# Patient Record
Sex: MALE | Race: BLACK OR AFRICAN AMERICAN
[De-identification: names, ages, dates, MRNs, and addresses within clinical notes are randomized per-mention and may not be internally consistent; named-entity substitution may affect disease eponyms.]

---

## 2017-11-18 ENCOUNTER — HOSPITAL ENCOUNTER (EMERGENCY)
Dept: HOSPITAL 62 - ER | Age: 47
Discharge: HOME | End: 2017-11-18
Payer: MEDICARE

## 2017-11-18 VITALS — DIASTOLIC BLOOD PRESSURE: 82 MMHG | SYSTOLIC BLOOD PRESSURE: 127 MMHG

## 2017-11-18 DIAGNOSIS — N30.00: Primary | ICD-10-CM

## 2017-11-18 LAB
APPEARANCE UR: CLEAR
BILIRUB UR QL STRIP: NEGATIVE
GLUCOSE UR STRIP-MCNC: NEGATIVE MG/DL
KETONES UR STRIP-MCNC: NEGATIVE MG/DL
NITRITE UR QL STRIP: NEGATIVE
PH UR STRIP: 5 [PH] (ref 5–9)
PROT UR STRIP-MCNC: NEGATIVE MG/DL
SP GR UR STRIP: 1.02
UROBILINOGEN UR-MCNC: NEGATIVE MG/DL (ref ?–2)

## 2017-11-18 PROCEDURE — 81001 URINALYSIS AUTO W/SCOPE: CPT

## 2017-11-18 PROCEDURE — 99283 EMERGENCY DEPT VISIT LOW MDM: CPT

## 2017-11-18 NOTE — ER DOCUMENT REPORT
ED GI/





- General


Chief Complaint: Urinary Problem


Stated Complaint: FREQUENT URINATION


Time Seen by Provider: 11/18/17 16:21


Mode of Arrival: Ambulatory


Information source: Patient, Relative


TRAVEL OUTSIDE OF THE [place] IN LAST 30 DAYS: No





- HPI


Patient complains to provider of: Hematuria - mother states pt. has been having 

frequency and hematuria for the past 2-3 days.





- Related Data


Allergies/Adverse Reactions: 


 





No Known Allergies Allergy (Verified 11/18/17 16:11)


 











Past Medical History





- General


Information source: Relative





- Social History


Smoking Status: Never Smoker


Cigarette use (# per day): No


Chew tobacco use (# tins/day): No


Smoking Education Provided: No


Family History: Reviewed & Not Pertinent


Malignancy Medical History: Reports Hx Brain Cancer





- Immunizations


Hx Diphtheria, Pertussis, Tetanus Vaccination: Yes





Review of Systems





- Review of Systems


Constitutional: No symptoms reported


Cardiovascular: No symptoms reported


Respiratory: No symptoms reported


Gastrointestinal: No symptoms reported


Genitourinary: See HPI, Frequency, Hematuria


Musculoskeletal: No symptoms reported


-: Yes All other systems reviewed and negative





Physical Exam





- Vital signs


Vitals: 





 











Temp Pulse Resp BP Pulse Ox


 


 98.8 F   100   16   127/82 H  95 


 


 11/18/17 16:13  11/18/17 16:13  11/18/17 16:13  11/18/17 16:13  11/18/17 16:13














- General


General appearance: Appears well


In distress: None





- Respiratory


Respiratory status: No respiratory distress


Breath sounds: Normal





- Cardiovascular


Rhythm: Regular


Heart sounds: Normal auscultation





- Abdominal


Inspection: Normal


Tenderness: Nontender





Course





- Vital Signs


Vital signs: 





 











Temp Pulse Resp BP Pulse Ox


 


 98.8 F   100   16   127/82 H  95 


 


 11/18/17 16:13  11/18/17 16:13  11/18/17 16:13  11/18/17 16:13  11/18/17 16:13

## 2017-11-20 ENCOUNTER — HOSPITAL ENCOUNTER (EMERGENCY)
Dept: HOSPITAL 62 - ER | Age: 47
Discharge: HOME | End: 2017-11-20
Payer: MEDICARE

## 2017-11-20 VITALS — DIASTOLIC BLOOD PRESSURE: 58 MMHG | SYSTOLIC BLOOD PRESSURE: 113 MMHG

## 2017-11-20 DIAGNOSIS — Z85.841: ICD-10-CM

## 2017-11-20 DIAGNOSIS — I10: ICD-10-CM

## 2017-11-20 DIAGNOSIS — N39.0: ICD-10-CM

## 2017-11-20 DIAGNOSIS — T49.0X5A: ICD-10-CM

## 2017-11-20 DIAGNOSIS — R53.1: ICD-10-CM

## 2017-11-20 DIAGNOSIS — L27.0: Primary | ICD-10-CM

## 2017-11-20 DIAGNOSIS — H65.92: ICD-10-CM

## 2017-11-20 DIAGNOSIS — R00.0: ICD-10-CM

## 2017-11-20 LAB
ALBUMIN SERPL-MCNC: 3.5 G/DL (ref 3.5–5)
ALP SERPL-CCNC: 59 U/L (ref 38–126)
ALT SERPL-CCNC: 38 U/L (ref 21–72)
ANION GAP SERPL CALC-SCNC: 15 MMOL/L (ref 5–19)
AST SERPL-CCNC: 42 U/L (ref 17–59)
BASOPHILS # BLD AUTO: 0 10^3/UL (ref 0–0.2)
BASOPHILS NFR BLD AUTO: 0.4 % (ref 0–2)
BILIRUB DIRECT SERPL-MCNC: 0.3 MG/DL (ref 0–0.4)
BILIRUB SERPL-MCNC: 0.5 MG/DL (ref 0.2–1.3)
BUN SERPL-MCNC: 15 MG/DL (ref 7–20)
CALCIUM: 8.6 MG/DL (ref 8.4–10.2)
CHLORIDE SERPL-SCNC: 102 MMOL/L (ref 98–107)
CO2 SERPL-SCNC: 24 MMOL/L (ref 22–30)
CREAT SERPL-MCNC: 1.35 MG/DL (ref 0.52–1.25)
EOSINOPHIL # BLD AUTO: 0.2 10^3/UL (ref 0–0.6)
EOSINOPHIL NFR BLD AUTO: 1.2 % (ref 0–6)
ERYTHROCYTE [DISTWIDTH] IN BLOOD BY AUTOMATED COUNT: 14.4 % (ref 11.5–14)
GLUCOSE SERPL-MCNC: 98 MG/DL (ref 75–110)
HCT VFR BLD CALC: 44.5 % (ref 37.9–51)
HGB BLD-MCNC: 14.8 G/DL (ref 13.5–17)
HGB HCT DIFFERENCE: -0.1
LYMPHOCYTES # BLD AUTO: 1.6 10^3/UL (ref 0.5–4.7)
LYMPHOCYTES NFR BLD AUTO: 12.2 % (ref 13–45)
MCH RBC QN AUTO: 29.3 PG (ref 27–33.4)
MCHC RBC AUTO-ENTMCNC: 33.3 G/DL (ref 32–36)
MCV RBC AUTO: 88 FL (ref 80–97)
MONOCYTES # BLD AUTO: 0.4 10^3/UL (ref 0.1–1.4)
MONOCYTES NFR BLD AUTO: 3.2 % (ref 3–13)
NEUTROPHILS # BLD AUTO: 11.1 10^3/UL (ref 1.7–8.2)
NEUTS SEG NFR BLD AUTO: 83 % (ref 42–78)
POTASSIUM SERPL-SCNC: 4.3 MMOL/L (ref 3.6–5)
PROT SERPL-MCNC: 5.5 G/DL (ref 6.3–8.2)
RBC # BLD AUTO: 5.06 10^6/UL (ref 4.35–5.55)
SODIUM SERPL-SCNC: 140.7 MMOL/L (ref 137–145)
WBC # BLD AUTO: 13.4 10^3/UL (ref 4–10.5)

## 2017-11-20 PROCEDURE — 80053 COMPREHEN METABOLIC PANEL: CPT

## 2017-11-20 PROCEDURE — 99283 EMERGENCY DEPT VISIT LOW MDM: CPT

## 2017-11-20 PROCEDURE — 85025 COMPLETE CBC W/AUTO DIFF WBC: CPT

## 2017-11-20 PROCEDURE — 36415 COLL VENOUS BLD VENIPUNCTURE: CPT

## 2017-11-20 PROCEDURE — 96374 THER/PROPH/DIAG INJ IV PUSH: CPT

## 2017-11-20 PROCEDURE — 96361 HYDRATE IV INFUSION ADD-ON: CPT

## 2017-11-20 NOTE — ER DOCUMENT REPORT
ED Allergic Reaction





- General


Chief Complaint: Allergic Reaction


Stated Complaint: POSSIBLE ALLERGIC REACTION


Time Seen by Provider: 11/20/17 11:27


Mode of Arrival: Wheelchair


Information source: Patient, Parent


Notes: 





Patient has a history of brain tumor since birth.  He states that he was 

recently seen here for a urinary tract infection and given Septra.  He has 

taken his first dose this morning and then developed a rash.  He does not have 

any known history of allergies to medications.  He denies any trouble speaking 

or swallowing.  No trouble breathing.  He has not noticed any abnormal 

sensations in his mouth or tongue swelling.  No lip soreness swelling or 

lesions.  Patient has not been lightheaded or dizzy.  Symptoms of been 

consistent with an allergic reaction consisting of erythema and swelling of the 

extremities and trunk.  Nothing has made it better or worse.  It is been 

constant.  There is been mild to moderate.  There is no radiation of the 

symptoms.


TRAVEL OUTSIDE OF THE [place] IN LAST 30 DAYS: No





- Related Data


Allergies/Adverse Reactions: 


 





sulfamethoxazole [From Bactrim] Allergy (Verified 11/20/17 11:30)


 


trimethoprim [From Bactrim] Allergy (Verified 11/20/17 11:30)


 











Past Medical History





- General


Information source: Patient, Parent





- Social History


Smoking Status: Never Smoker


Chew tobacco use (# tins/day): No


Frequency of alcohol use: None


Drug Abuse: None


Family History: Reviewed & Not Pertinent


Patient has suicidal ideation: No


Patient has homicidal ideation: No





- Past Medical History


Cardiac Medical History: Reports: Hx Hypertension


Renal/ Medical History: Denies: Hx Peritoneal Dialysis


Malignancy Medical History: Reports Hx Brain Cancer





- Immunizations


Hx Diphtheria, Pertussis, Tetanus Vaccination: Yes





Review of Systems





- Review of Systems


Constitutional: denies: Chills, Fever


Cardiovascular: denies: Chest pain, Palpitations


Respiratory: denies: Cough, Short of breath


-: Yes All other systems reviewed and negative





Physical Exam





- Vital signs


Vitals: 





 











Temp Pulse Resp BP Pulse Ox


 


 98.6 F   109 H  18   116/74   98 


 


 11/20/17 10:51  11/20/17 10:51  11/20/17 10:51  11/20/17 10:51  11/20/17 10:51











Interpretation: Tachycardic





- General


General appearance: Appears well, Alert


In distress: None - No distress.  Abdomen is okay





- HEENT


Head: Normocephalic, Atraumatic


Eyes: Normal


Pupils: PERRL


External canal: Cerumen impaction, Other - Right canal is occluded with cerumen


Tympanic membrane: Bulging, Injected, Purulent effusion, Other - Left membrane 

is erythematous and injected and bulging with some surrounding effusion.





- Respiratory


Respiratory status: No respiratory distress


Chest status: Nontender


Breath sounds: Normal


Chest palpation: Normal





- Cardiovascular


Rhythm: Regular, Other - Repeat exam shows pulse to be 88 at 1:40 PM on my exam.


Heart sounds: Normal auscultation


Murmur: No





- Abdominal


Inspection: Normal


Distension: No distension


Bowel sounds: Normal


Tenderness: Nontender


Organomegaly: No organomegaly





- Back


Back: Normal, Nontender





- Extremities


General upper extremity: Other - Bilateral upper extremities have urticaria and 

erythema.  He also has a nonblanching erythematous rash on the left upper 

extremity.


General lower extremity: Normal inspection, Nontender, Normal color, Normal 

temperature.  No: Marivel's sign





- Neurological


Neuro grossly intact: Yes


Cognition: Normal


Hooven Coma Scale Eye Opening: Spontaneous


Isai Coma Scale Verbal: Oriented


Isai Coma Scale Motor: Obeys Commands


Hooven Coma Scale Total: 15


Additional motor exam normals: Weakness, Other - Right upper extremity and 

lower extremities.  This is chronic.


Sensory: Normal





- Psychological


Associated symptoms: Normal affect, Normal mood





- Skin


Skin Temperature: Warm


Skin Moisture: Dry


Skin Color: Other - Patient has diffuse urticaria and erythema of the upper 

extremities and trunk.  He also has a separate rash which seems to consistently 

nonblanching erythema and macules.  There are no oral lesions or lip lesions.





Course





- Re-evaluation


Re-evalutation: 





11/20/17 13:50


On reexam at 140.  Patient is sleeping comfortably but easily awoken.  He 

denies any symptoms.  The rash appears to be resolving.  There is no evidence 

of Montano-Kalen syndrome.  There is no evidence of more than one system 

involvement.  Vital signs are unremarkable.





- Vital Signs


Vital signs: 





 











Temp Pulse Resp BP Pulse Ox


 


 98.6 F   109 H  18   116/74   98 


 


 11/20/17 10:51  11/20/17 10:51  11/20/17 10:51  11/20/17 10:51  11/20/17 10:51














- Laboratory


Result Diagrams: 


 11/20/17 12:15





 11/20/17 12:15


Laboratory results interpreted by me: 





 











  11/20/17 11/20/17





  12:15 12:15


 


WBC  13.4 H 


 


RDW  14.4 H 


 


Seg Neutrophils %  83.0 H 


 


Lymphocytes %  12.2 L 


 


Absolute Neutrophils  11.1 H 


 


Creatinine   1.35 H


 


Est GFR (Non-Af Amer)   56 L


 


Total Protein   5.5 L














Discharge





- Discharge


Clinical Impression: 


 Left otitis media with effusion





Allergic drug reaction


Qualifiers:


 Encounter type: initial encounter Qualified Code(s): T78.40XA - Allergy, 

unspecified, initial encounter





Condition: Stable


Disposition: HOME, SELF-CARE


Instructions:  Otitis Media ([facility]), Acute Allergic Reaction to Drugs ([facility])


Additional Instructions: 


Please be rechecked by your primary care physician in 2-3 days.





Your creatinine (which is a measure of your kidney function) is mildly 

elevated.  This is most likely due to some mild dehydration.  Please increase 

your fluid intake and have your creatinine rechecked at her next doctor's visit.


Prescriptions: 


Cefdinir 300 mg PO BID 7 Days #14 capsule


Diphenhydramine HCl [Benadryl] 25 mg PO Q6 PRN 4 Days #16 capsule


 PRN Reason: 


Prednisone 50 mg PO DAILY 5 Days #5 tablet

## 2017-11-20 NOTE — ER DOCUMENT REPORT
ED Medical Screen (RME)





- General


Chief Complaint: Allergic Reaction


Stated Complaint: POSSIBLE ALLERGIC REACTION


Time Seen by Provider: 11/20/17 11:27


Notes: 


Patient is brought in by mom.  Patient has a chronic diagnosis of brain tumor.  

He was recently seen here and diagnosed with a urinary tract infection.  He has 

taken several days of p.o. Bactrim.  Today he began to have swelling and 

erythema of the upper extremities and trunk.  He denies any problems breathing 

or swallowing.  No oral lesions have been noted.  Patient also claims to have 

decreased hearing in the left ear and family states they noticed discharge from 

this here today.  On exam the left ear does appear infected.  The right ear is 

occluded with cerumen.


TRAVEL OUTSIDE OF THE [place] IN LAST 30 DAYS: No





- Related Data


Allergies/Adverse Reactions: 


 





No Known Allergies Allergy (Verified 11/20/17 10:49)


 











Past Medical History





- Social History


Chew tobacco use (# tins/day): No


Frequency of alcohol use: None


Drug Abuse: None





- Past Medical History


Cardiac Medical History: Reports: Hx Hypertension


Renal/ Medical History: Denies: Hx Peritoneal Dialysis


Malignancy Medical History: Reports Hx Brain Cancer





- Immunizations


Hx Diphtheria, Pertussis, Tetanus Vaccination: Yes





Physical Exam





- Vital signs


Vitals: 





 











Temp Pulse Resp BP Pulse Ox


 


 98.6 F   109 H  18   116/74   98 


 


 11/20/17 10:51  11/20/17 10:51  11/20/17 10:51  11/20/17 10:51  11/20/17 10:51














Course





- Vital Signs


Vital signs: 





 











Temp Pulse Resp BP Pulse Ox


 


 98.6 F   109 H  18   116/74   98 


 


 11/20/17 10:51  11/20/17 10:51  11/20/17 10:51  11/20/17 10:51  11/20/17 10:51

## 2018-06-26 ENCOUNTER — HOSPITAL ENCOUNTER (OUTPATIENT)
Dept: HOSPITAL 62 - SP | Age: 48
End: 2018-06-26
Attending: PODIATRIST
Payer: MEDICARE

## 2018-06-26 DIAGNOSIS — I82.401: Primary | ICD-10-CM

## 2018-06-26 PROCEDURE — 93971 EXTREMITY STUDY: CPT

## 2018-06-26 NOTE — RADIOLOGY REPORT (SQ)
EXAM DESCRIPTION:  VENOUS UNILATERAL LOWER



COMPLETED DATE/TIME:  6/26/2018 3:13 pm



REASON FOR STUDY:  RLE PAIN, ACUTE EMBOLI I82.401  ACUTE EMBOLISM AND THOMBOS UNSP DEEP VEINS OF R LO
W



COMPARISON:  None.



TECHNIQUE:  Dynamic and static gray scale and color images acquired of the right leg venous system. S
elected spectral images acquired with additional compression and augmentation maneuvers. The contrala
teral common femoral vein and saphenofemoral junction were also imaged. Images stored on PACS.



LIMITATIONS:  None.



FINDINGS:  COMMON FEMORAL: Normal phasicity, compression and augmentation. No visualized echogenic ma
terial on gray scale. No defects on color images.

FEMORAL: Normal compression and augmentation. No visualized echogenic material on gray scale. No defe
cts on color images.

POPLITEAL: Normal compression, augmentation. No visualized echogenic material on gray scale. No defec
ts on color images.

CALF VESSELS: Normal compression, augmentation. No visualized echogenic material on gray scale. No de
fects on color images.

GSV and SSV: Normal compression, augmentation. No visualized echogenic material on gray scale. No def
ects on color images.

ANY DEEP VENOUS INSUFFICIENCY: No.

ANY EVIDENCE OF POPLITEAL CYST: No.

OTHER: No other significant finding.

CONTRALATERAL COMMON FEMORAL VEIN AND SAPHENOFEMORAL JUNCTION:

Normal phasicity, compression and augmentation. No visualized echogenic material on gray scale. No de
fects on color images.



IMPRESSION:  NO EVIDENCE OF DVT OR SVT IN THE RIGHT LEG.



TECHNICAL DOCUMENTATION:  JOB ID:  [id number]

 2011 [employer]- All Rights Reserved



Reading location - IP/workstation name: [email or web address]

## 2018-10-22 ENCOUNTER — HOSPITAL ENCOUNTER (EMERGENCY)
Dept: HOSPITAL 62 - ER | Age: 48
Discharge: HOME | End: 2018-10-22
Payer: MEDICARE

## 2018-10-22 VITALS — SYSTOLIC BLOOD PRESSURE: 104 MMHG | DIASTOLIC BLOOD PRESSURE: 57 MMHG

## 2018-10-22 DIAGNOSIS — L50.0: Primary | ICD-10-CM
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
## 2018-10-22 DIAGNOSIS — I10: ICD-10-CM

## 2018-10-22 DIAGNOSIS — T78.40XA: ICD-10-CM

## 2018-10-22 PROCEDURE — 99282 EMERGENCY DEPT VISIT SF MDM: CPT

## 2018-10-22 NOTE — ER DOCUMENT REPORT
HPI





- HPI


Pain Level: 0


Notes: 





Patient is a 48-year-old male who presents with chief complaint of possible 

allergic reaction.  Patient's family member at bedside state that he was having 

some itching so she gave him over-the-counter generic diphenhydramine.  She 

reports that he then developed mild hives.  She believes he is allergic to the 

diphenhydramine.  Patient denies any difficulty swallowing, is speaking in full 

and complete sentences.











- CONSTITUTIONAL


Constitutional: DENIES: Fever, Chills





- EENT


EENT: DENIES: Sore Throat, Ear Pain, Eye problems





- NEURO


Neurology: DENIES: Headache, Weakness, Vision blurred, Dizzinesss / Vertigo





- CARDIOVASCULAR


Cardiovascular: DENIES: Chest pain





- RESPIRATORY


Respiratory: DENIES: Trouble Breathing, Coughing





- GASTROINTESTINAL


Gastrointestinal: DENIES: Abdominal Pain, Black / Bloody Stools





- URINARY


Urinary: DENIES: Dysuria, Urgency, Frequency





- MUSCULOSKELETAL


Musculoskeletal: DENIES: Extremity pain





Past Medical History





- General


Information source: Patient





- Social History


Smoking Status: Never Smoker


Chew tobacco use (# tins/day): No


Frequency of alcohol use: None


Drug Abuse: None


Family History: Reviewed & Not Pertinent


Patient has suicidal ideation: No


Patient has homicidal ideation: No





- Past Medical History


Cardiac Medical History: Reports: Hx Hypertension


Renal/ Medical History: Denies: Hx Peritoneal Dialysis


Malignancy Medical History: Reports Hx Brain Cancer





- Immunizations


Hx Diphtheria, Pertussis, Tetanus Vaccination: Yes





Vertical Provider Document





- CONSTITUTIONAL


Notes: 





PHYSICAL EXAMINATION:





GENERAL: Well-appearing, well-nourished and in no acute distress.





HEAD: Atraumatic, normocephalic.





EYES: Pupils equal round extraocular movements intact,  conjunctiva are normal.





ENT: Nares patent, no airway swelling noted, patient speaking in full and 

complete sentences and is able to swallow without difficulty.





NECK: Normal range of motion





LUNGS: No respiratory distress





Musculoskeletal: Normal range of motion





NEUROLOGICAL:  Normal speech, normal gait. 





PSYCH: Normal mood, normal affect.





SKIN: Warm, Dry, normal turgor, scattered red rash consistent with hives noted 

across chest and back, rash is blanchable.





- INFECTION CONTROL


TRAVEL OUTSIDE OF THE [place] IN LAST 30 DAYS: No





Course





- Re-evaluation


Re-evalutation: 





Patient's examination is consistent with mild allergic reaction.  Will give 

patient both Pepcid and prednisone, will hold off on diphenhydramine as family 

member thinks this is the cause of the allergic reaction.  Patient will be 

discharged home in stable condition.





- Vital Signs


Vital signs: 


 











Temp Pulse Resp BP Pulse Ox


 


 97.8 F   104 H  16   124/69   98 


 


 10/22/18 18:18  10/22/18 18:18  10/22/18 18:18  10/22/18 18:18  10/22/18 18:18














Discharge





- Discharge


Clinical Impression: 


 Rash





Condition: Stable


Disposition: HOME, SELF-CARE


Additional Instructions: 


It is unclear what your rash is being caused by.  Please take the prednisone 

and the Pepcid this should help calm down your symptoms.  Please follow-up with 

your primary care provider, call them tomorrow for an appointment I would like 

you to be reevaluated by them in the next 3 days.  Return to the emergency 

department if you develop worsening symptoms such as difficulty breathing, 

shortness of breath or difficulty swallowing.


Prescriptions: 


Famotidine [Pepcid 40 mg Tablet] 40 mg PO BID #10 tablet


Prednisone [Deltasone 20 mg Tablet] 3 tab PO DAILY 4 Days #12 tablet

## 2018-10-24 ENCOUNTER — HOSPITAL ENCOUNTER (EMERGENCY)
Dept: HOSPITAL 62 - ER | Age: 48
Discharge: HOME | End: 2018-10-24
Payer: MEDICARE

## 2018-10-24 VITALS — SYSTOLIC BLOOD PRESSURE: 118 MMHG | DIASTOLIC BLOOD PRESSURE: 56 MMHG

## 2018-10-24 DIAGNOSIS — R62.50: ICD-10-CM

## 2018-10-24 DIAGNOSIS — I10: ICD-10-CM

## 2018-10-24 DIAGNOSIS — R21: Primary | ICD-10-CM

## 2018-10-24 DIAGNOSIS — R07.9: ICD-10-CM

## 2018-10-24 DIAGNOSIS — M79.602: ICD-10-CM

## 2018-10-24 LAB
ABSOLUTE LYMPHOCYTES# (MANUAL): 2.6 10^3/UL (ref 0.5–4.7)
ABSOLUTE MONOCYTES # (MANUAL): 2.1 10^3/UL (ref 0.1–1.4)
ABSOLUTE NEUTROPHILS# (MANUAL): 11.4 10^3/UL (ref 1.7–8.2)
ADD MANUAL DIFF: YES
ALBUMIN SERPL-MCNC: 4.1 G/DL (ref 3.5–5)
ALP SERPL-CCNC: 77 U/L (ref 38–126)
ALT SERPL-CCNC: 27 U/L (ref 21–72)
ANION GAP SERPL CALC-SCNC: 7 MMOL/L (ref 5–19)
ANISOCYTOSIS BLD QL SMEAR: SLIGHT
AST SERPL-CCNC: 25 U/L (ref 17–59)
BASOPHILS NFR BLD MANUAL: 0 % (ref 0–2)
BILIRUB DIRECT SERPL-MCNC: 0.2 MG/DL (ref 0–0.4)
BILIRUB SERPL-MCNC: 0.4 MG/DL (ref 0.2–1.3)
BUN SERPL-MCNC: 12 MG/DL (ref 7–20)
CALCIUM: 9.4 MG/DL (ref 8.4–10.2)
CHLORIDE SERPL-SCNC: 102 MMOL/L (ref 98–107)
CK MB SERPL-MCNC: 1.77 NG/ML (ref ?–4.55)
CK SERPL-CCNC: 140 U/L (ref 55–170)
CO2 SERPL-SCNC: 33 MMOL/L (ref 22–30)
EOSINOPHIL NFR BLD MANUAL: 0 % (ref 0–6)
ERYTHROCYTE [DISTWIDTH] IN BLOOD BY AUTOMATED COUNT: 14.5 % (ref 11.5–14)
GLUCOSE SERPL-MCNC: 87 MG/DL (ref 75–110)
HCT VFR BLD CALC: 40.8 % (ref 37.9–51)
HGB BLD-MCNC: 13.5 G/DL (ref 13.5–17)
MCH RBC QN AUTO: 29 PG (ref 27–33.4)
MCHC RBC AUTO-ENTMCNC: 33.1 G/DL (ref 32–36)
MCV RBC AUTO: 88 FL (ref 80–97)
MONOCYTES % (MANUAL): 13 % (ref 3–13)
OVALOCYTES BLD QL SMEAR: SLIGHT
PLATELET # BLD: 290 10^3/UL (ref 150–450)
PLATELET COMMENT: ADEQUATE
POIKILOCYTOSIS BLD QL SMEAR: SLIGHT
POTASSIUM SERPL-SCNC: 3.6 MMOL/L (ref 3.6–5)
PROT SERPL-MCNC: 6.8 G/DL (ref 6.3–8.2)
RBC # BLD AUTO: 4.67 10^6/UL (ref 4.35–5.55)
SEGMENTED NEUTROPHILS % (MAN): 71 % (ref 42–78)
SODIUM SERPL-SCNC: 141.7 MMOL/L (ref 137–145)
TOTAL CELLS COUNTED BLD: 100
TROPONIN I SERPL-MCNC: < 0.012 NG/ML
VARIANT LYMPHS NFR BLD MANUAL: 14 % (ref 13–45)
WBC # BLD AUTO: 16 10^3/UL (ref 4–10.5)

## 2018-10-24 PROCEDURE — 82550 ASSAY OF CK (CPK): CPT

## 2018-10-24 PROCEDURE — 93010 ELECTROCARDIOGRAM REPORT: CPT

## 2018-10-24 PROCEDURE — 93005 ELECTROCARDIOGRAM TRACING: CPT

## 2018-10-24 PROCEDURE — 85025 COMPLETE CBC W/AUTO DIFF WBC: CPT

## 2018-10-24 PROCEDURE — 71046 X-RAY EXAM CHEST 2 VIEWS: CPT

## 2018-10-24 PROCEDURE — 80053 COMPREHEN METABOLIC PANEL: CPT

## 2018-10-24 PROCEDURE — 84484 ASSAY OF TROPONIN QUANT: CPT

## 2018-10-24 PROCEDURE — 82553 CREATINE MB FRACTION: CPT

## 2018-10-24 PROCEDURE — 36415 COLL VENOUS BLD VENIPUNCTURE: CPT

## 2018-10-24 PROCEDURE — 99285 EMERGENCY DEPT VISIT HI MDM: CPT

## 2018-10-24 NOTE — ER DOCUMENT REPORT
ED Medical Screen (RME)





- General


Chief Complaint: Chest Pain


Stated Complaint: CHEST PAIN, RASH


Time Seen by Provider: 10/24/18 13:03


TRAVEL OUTSIDE OF THE [place] IN LAST 30 DAYS: No





- HPI


Notes: 





10/24/18 13:08


Chest pain and a rash





- Related Data


Allergies/Adverse Reactions: 


 





sulfamethoxazole [From Bactrim] Allergy (Verified 10/24/18 12:32)


 


trimethoprim [From Bactrim] Allergy (Verified 10/24/18 12:32)


 











Past Medical History





- Social History


Chew tobacco use (# tins/day): No


Frequency of alcohol use: None


Drug Abuse: None





- Past Medical History


Cardiac Medical History: Reports: Hx Hypertension


Renal/ Medical History: Denies: Hx Peritoneal Dialysis


Malignancy Medical History: Reports Hx Brain Cancer





- Immunizations


Hx Diphtheria, Pertussis, Tetanus Vaccination: Yes





Review of Systems





- Review of Systems


Cardiovascular: Chest pain


Skin: Rash





Physical Exam





- Vital signs


Vitals: 





 











Temp Pulse Resp BP Pulse Ox


 


 98.1 F   88   20   127/56 H  97 


 


 10/24/18 12:59  10/24/18 12:59  10/24/18 12:59  10/24/18 12:59  10/24/18 12:59














- Respiratory


Respiratory status: No respiratory distress


Chest status: Nontender


Breath sounds: Normal


Chest palpation: Normal





- Cardiovascular


Rhythm: Regular


Heart sounds: Normal auscultation





Course





- Vital Signs


Vital signs: 





 











Temp Pulse Resp BP Pulse Ox


 


 98.1 F   88   20   127/56 H  97 


 


 10/24/18 12:59  10/24/18 12:59  10/24/18 12:59  10/24/18 12:59  10/24/18 12:59

## 2018-10-24 NOTE — EKG REPORT
SEVERITY:- BORDERLINE ECG -

SINUS RHYTHM

BORDERLINE LEFT AXIS DEVIATION

BORDERLINE T ABNORMALITIES, ANT-LAT LEADS

:

Confirmed by: Ferdinand Carson 24-Oct-2018 22:36:41

## 2018-10-24 NOTE — RADIOLOGY REPORT (SQ)
EXAM DESCRIPTION:  CHEST 2 VIEWS



COMPLETED DATE/TIME:  10/24/2018 1:44 pm



REASON FOR STUDY:  cp



COMPARISON:  None.



EXAM PARAMETERS:  NUMBER OF VIEWS: two views

TECHNIQUE: Digital Frontal and Lateral radiographic views of the chest acquired.

RADIATION DOSE: NA

LIMITATIONS: none



FINDINGS:  LUNGS AND PLEURA: No opacities, masses or pneumothorax. No pleural effusion.

MEDIASTINUM AND HILAR STRUCTURES: No masses or contour abnormalities.

HEART AND VASCULAR STRUCTURES: Heart normal size.  No evidence for failure.

BONES: No acute findings.

HARDWARE: None in the chest.

OTHER: No other significant finding.



IMPRESSION:  NO ACUTE RADIOGRAPHIC FINDING IN THE CHEST.



TECHNICAL DOCUMENTATION:  JOB ID:  [id number]

 2011 [employer]- All Rights Reserved



Reading location - IP/workstation name: [email or web address]

## 2018-10-24 NOTE — ER DOCUMENT REPORT
ED General





- General


Chief Complaint: Chest Pain


Stated Complaint: CHEST PAIN, RASH


Time Seen by Provider: 10/24/18 13:03


Notes: 





Patient is a 48-year-old male with developmental delay that presents to the 

emergency department for chief complaint of left chest pain.  Patient has pain 

over the last few days with reaching with his left arm, when he is stretching 

to reach for something.  He points to his chest and towards his shoulder.  He 

does not have pain when he is not reaching for anything.  Has not had shortness 

of breath.  The patient is a poor historian due to his developmental delay.  

Mother states he just started complaining of this yesterday.  He was recently 

in the emergency department and was being treated for a skin rash along his 

neck back and arms, and was started on prednisone, he has had 2 doses so far.  

She is also concerned about this.  Patient denies having any pain in his chest 

at this time, and denies any recent fevers, chills, cough or shortness of 

breath or difficulty breathing.





Past Medical History: Hypertension, history of brain tumor


Past Surgical History: Radiation to the brain


Social History: Denies tobacco, alcohol or drug use


Family History: Reviewed and noncontributory for presenting illness


Allergies: Reviewed, see documented allergy list. 





REVIEW OF SYSTEMS:


Unless otherwise stated in this report the patient's positive and negative 

responses for review of systems for constitutional, eyes, ENT, cardiovascular, 

respiratory, gastrointestinal, neurological, genitourinary, musculoskeletal, 

and integumentary systems and related systems to the presenting problem are 

either as stated in the HPI or were not pertinent or were negative for the 

symptoms and/or complaints related to the presenting medical problem.





PHYSICAL EXAMINATION:





Vital signs reviewed, nursing noted reviewed. 





GENERAL: Well-appearing, well-nourished and in no acute distress.





HEAD: Atraumatic, normocephalic.





EYES: Eyes appear normal, extraocular movements intact, sclera anicteric, 

conjunctiva are normal.





ENT: nares patent, oropharynx clear without exudates.  Moist mucous membranes.





NECK: Normal range of motion, supple without lymphadenopathy





LUNGS: Breath sounds clear to auscultation bilaterally and equal.  No wheezes 

rales or rhonchi.  Reproducible chest wall tenderness with palpation





HEART: Regular rate and rhythm without murmurs





ABDOMEN: Soft, nontender, normoactive bowel sounds.  No rebound, guarding, or 

rigidity. No masses appreciated.





EXTREMITIES: Nontender, good range of motion, no pitting or edema.  





NEUROLOGICAL: No focal neurological deficits.  The right upper extremity, has 

minimal movement, this is chronic according to the patient's mother, from his 

brain tumor that he had a 1 years old, left upper extremity has good strength, 

as well as the left and right lower extremities bilaterally.





PSYCH: Normal mood, normal affect.





SKIN: Warm, Dry, normal turgor, erythematous skin rash noted to the neck, 

shoulders, and right arm, consistent with eczema





TRAVEL OUTSIDE OF THE [place] IN LAST 30 DAYS: No





- Related Data


Allergies/Adverse Reactions: 


 





sulfamethoxazole [From Bactrim] Allergy (Verified 10/24/18 12:32)


 


trimethoprim [From Bactrim] Allergy (Verified 10/24/18 12:32)


 











Past Medical History





- Social History


Smoking Status: Never Smoker


Chew tobacco use (# tins/day): No


Frequency of alcohol use: None


Drug Abuse: None


Family History: Reviewed & Not Pertinent


Patient has suicidal ideation: No


Patient has homicidal ideation: No





- Past Medical History


Cardiac Medical History: Reports: Hx Hypertension


Renal/ Medical History: Denies: Hx Peritoneal Dialysis


Malignancy Medical History: Reports Hx Brain Cancer





- Immunizations


Hx Diphtheria, Pertussis, Tetanus Vaccination: Yes





Physical Exam





- Vital signs


Vitals: 


 











Temp Pulse Resp BP Pulse Ox


 


 98.1 F   88   20   127/56 H  97 


 


 10/24/18 12:59  10/24/18 12:59  10/24/18 12:59  10/24/18 12:59  10/24/18 12:59














Course





- Re-evaluation


Re-evalutation: 





Presentation of chest pain in an otherwise well appearing patient. Low clinical 

suspicion for ACS given clinical history, exam, EKG without ST elevations or 

depressions, and negative initial troponin. HEART score less than or equal to 

3. PE also seems unlikely given clinical history, absence of tachycardia or 

dyspnea. Patient is PERC criteria negative. CXR without evidence of 

pneumothorax or pneumonia. No widened mediastinum. Aortic dissection also seems 

unlikely given history, symmetric pulses, CXR, and vitals. 





HEART Score:





History   0   


ECG   0   


Age   1   


Risk Factors   1


Troponin   0





Total: 2





Chest pain in a patient without evidence of cardiac or other serious etiology 

on workup today. I discussed with patient that, based on their age, risk 

factors and emergency department testing today, the likelihood that their 

symptoms are related to a heart attack is very low (estimated risk of heart 

attack or death over the next 30 days of less than 1%).  The patient 

demonstrates decision making capacity and has verbalized an understanding of 

these risks to me. Based on this,  the patient has chosen to follow-up as an 

outpatient. Usual chest pain return precautions reviewed. The patient states 

understanding and agreement with this plan.





On his blood work he does have a mild leukocytosis, this is most likely 

secondary to the patient's prednisone use currently, it is neutrophil 

predominant





Mother was advised to treat the rash, with moisturizing lotions, and to finish 

the prednisone and follow-up with primary care physician.








- Vital Signs


Vital signs: 


 











Temp Pulse Resp BP Pulse Ox


 


 98.1 F   88   20   127/56 H  97 


 


 10/24/18 12:59  10/24/18 12:59  10/24/18 12:59  10/24/18 12:59  10/24/18 12:59














- Laboratory


Result Diagrams: 


 10/24/18 12:25





 10/24/18 12:25


Laboratory results interpreted by me: 


 











  10/24/18 10/24/18





  12:25 12:25


 


WBC  16.0 H 


 


RDW  14.5 H 


 


Abs Neuts (Manual)  11.4 H 


 


Abs Monocytes (Manual)  2.1 H 


 


Carbon Dioxide   33 H














Discharge





- Discharge


Clinical Impression: 


 Rash





Chest pain


Qualifiers:


 Chest pain type: unspecified Qualified Code(s): R07.9 - Chest pain, unspecified





Condition: Stable


Disposition: HOME, SELF-CARE


Instructions:  Chest Wall Pain ([facility]), Atopic Dermatitis (Eczema) ([facility])


Additional Instructions: 


Please follow-up with the primary care physician, you can use topical 

moisturizing creams, the best ones for this are CeraVe and Lubriderm lotions 

that can help with itching and the rash. 


Referrals: 


OTTONIEL GONZALEZ MD [ACTIVE STAFF] - Follow up as needed (or his primary care 

physician)

## 2020-05-13 ENCOUNTER — HOSPITAL ENCOUNTER (OUTPATIENT)
Dept: HOSPITAL 62 - RAD | Age: 50
End: 2020-05-13
Attending: NEUROLOGICAL SURGERY
Payer: MEDICARE

## 2020-05-13 DIAGNOSIS — D47.9: Primary | ICD-10-CM

## 2020-05-13 PROCEDURE — 70553 MRI BRAIN STEM W/O & W/DYE: CPT

## 2020-05-13 PROCEDURE — 82565 ASSAY OF CREATININE: CPT

## 2020-05-13 PROCEDURE — A9576 INJ PROHANCE MULTIPACK: HCPCS

## 2020-05-14 NOTE — RADIOLOGY REPORT (SQ)
EXAM DESCRIPTION:  MRI HEAD COMBO



IMAGES COMPLETED DATE/TIME:  5/13/2020 5:22 pm



REASON FOR STUDY:  D49.7 NEOPLM OF UNSP BEHAV OF ENDO GLANDS AND OTH PRT NERVOUS SYS D49.7  NEOPLM OF
 UNSP BEHAV OF ENDO GLANDS AND OTH PRT NERVOU



COMPARISON:  10/14/2016



TECHNIQUE:  Multiplanar imaging includes noncontrasted T1, T2, FLAIR, diffusion with ADC map and post
gadolinium contrast T1 sequences. Images stored on PACS.



CONTRAST TYPE AND DOSE:  15 mL Prohance.



RENAL FUNCTION:  Not indicated.  ACR Type II contrast agent associated with few, if any, unconfounded
 cases of NSF



LIMITATIONS:  Motion artifact.



FINDINGS:  There has been interval increase in size of heterogeneously enhancing extra-axial mass lef
t parietal convexity, previously 1.2 x 0.9 cm, now 4.7 x 2.9 cm.  Dominant similar mass left sylvian 
fissure 4.6 x 4.5 cm AP by transverse diameter, stable.  Several additional smaller extra-axial jeff
s left cerebral hemisphere are not significantly changed.  There is been increase in associated vasog
enic edema with 8 mm of left-to-right midline shift.  No hemorrhage.

Posterior fossa unremarkable.



IMPRESSION:  Increase in size of 1 of multiple meningiomas.  Worsening associated vasogenic edema wit
h 8 mm of left-to-right midline shift.  No hemorrhage.

EVIDENCE OF ACUTE STROKE: NO.



TECHNICAL DOCUMENTATION:  JOB ID:  [id number]

 2011 [employer]- All Rights Reserved



Reading location - IP/workstation name: [email or web address]

## 2020-06-14 ENCOUNTER — HOSPITAL ENCOUNTER (EMERGENCY)
Dept: HOSPITAL 62 - ER | Age: 50
LOS: 1 days | Discharge: HOME | End: 2020-06-15
Payer: MEDICARE

## 2020-06-14 DIAGNOSIS — D49.9: ICD-10-CM

## 2020-06-14 DIAGNOSIS — Z85.841: ICD-10-CM

## 2020-06-14 DIAGNOSIS — I10: ICD-10-CM

## 2020-06-14 DIAGNOSIS — R22.0: ICD-10-CM

## 2020-06-14 DIAGNOSIS — Z92.3: ICD-10-CM

## 2020-06-14 DIAGNOSIS — Z88.1: ICD-10-CM

## 2020-06-14 DIAGNOSIS — Z88.2: ICD-10-CM

## 2020-06-14 DIAGNOSIS — R41.0: Primary | ICD-10-CM

## 2020-06-14 LAB
ADD MANUAL DIFF: NO
ALBUMIN SERPL-MCNC: 4.4 G/DL (ref 3.5–5)
ALP SERPL-CCNC: 74 U/L (ref 38–126)
ANION GAP SERPL CALC-SCNC: 7 MMOL/L (ref 5–19)
APTT BLD: 33.7 SEC (ref 23.5–35.8)
AST SERPL-CCNC: 25 U/L (ref 17–59)
BASOPHILS # BLD AUTO: 0 10^3/UL (ref 0–0.2)
BASOPHILS NFR BLD AUTO: 0.5 % (ref 0–2)
BILIRUB DIRECT SERPL-MCNC: 0 MG/DL (ref 0–0.4)
BILIRUB SERPL-MCNC: 0.2 MG/DL (ref 0.2–1.3)
BUN SERPL-MCNC: 13 MG/DL (ref 7–20)
CALCIUM: 10 MG/DL (ref 8.4–10.2)
CHLORIDE SERPL-SCNC: 99 MMOL/L (ref 98–107)
CO2 SERPL-SCNC: 30 MMOL/L (ref 22–30)
EOSINOPHIL # BLD AUTO: 0.2 10^3/UL (ref 0–0.6)
EOSINOPHIL NFR BLD AUTO: 2.4 % (ref 0–6)
ERYTHROCYTE [DISTWIDTH] IN BLOOD BY AUTOMATED COUNT: 14.2 % (ref 11.5–14)
GLUCOSE SERPL-MCNC: 122 MG/DL (ref 75–110)
HCT VFR BLD CALC: 44.1 % (ref 37.9–51)
HGB BLD-MCNC: 14.7 G/DL (ref 13.5–17)
INR PPP: 0.99
LYMPHOCYTES # BLD AUTO: 1.8 10^3/UL (ref 0.5–4.7)
LYMPHOCYTES NFR BLD AUTO: 20.9 % (ref 13–45)
MCH RBC QN AUTO: 29.3 PG (ref 27–33.4)
MCHC RBC AUTO-ENTMCNC: 33.3 G/DL (ref 32–36)
MCV RBC AUTO: 88 FL (ref 80–97)
MONOCYTES # BLD AUTO: 0.6 10^3/UL (ref 0.1–1.4)
MONOCYTES NFR BLD AUTO: 6.7 % (ref 3–13)
NEUTROPHILS # BLD AUTO: 6.1 10^3/UL (ref 1.7–8.2)
NEUTS SEG NFR BLD AUTO: 69.5 % (ref 42–78)
PLATELET # BLD: 258 10^3/UL (ref 150–450)
POTASSIUM SERPL-SCNC: 3.7 MMOL/L (ref 3.6–5)
PROT SERPL-MCNC: 7.2 G/DL (ref 6.3–8.2)
PROTHROMBIN TIME: 13.1 SEC (ref 11.4–15.4)
RBC # BLD AUTO: 5 10^6/UL (ref 4.35–5.55)
TOTAL CELLS COUNTED % (AUTO): 100 %
WBC # BLD AUTO: 8.8 10^3/UL (ref 4–10.5)

## 2020-06-14 PROCEDURE — 71045 X-RAY EXAM CHEST 1 VIEW: CPT

## 2020-06-14 PROCEDURE — 82550 ASSAY OF CK (CPK): CPT

## 2020-06-14 PROCEDURE — 93010 ELECTROCARDIOGRAM REPORT: CPT

## 2020-06-14 PROCEDURE — 93005 ELECTROCARDIOGRAM TRACING: CPT

## 2020-06-14 PROCEDURE — 70450 CT HEAD/BRAIN W/O DYE: CPT

## 2020-06-14 PROCEDURE — 80053 COMPREHEN METABOLIC PANEL: CPT

## 2020-06-14 PROCEDURE — 85025 COMPLETE CBC W/AUTO DIFF WBC: CPT

## 2020-06-14 PROCEDURE — 85730 THROMBOPLASTIN TIME PARTIAL: CPT

## 2020-06-14 PROCEDURE — 82140 ASSAY OF AMMONIA: CPT

## 2020-06-14 PROCEDURE — 83735 ASSAY OF MAGNESIUM: CPT

## 2020-06-14 PROCEDURE — 99285 EMERGENCY DEPT VISIT HI MDM: CPT

## 2020-06-14 PROCEDURE — 84484 ASSAY OF TROPONIN QUANT: CPT

## 2020-06-14 PROCEDURE — 81001 URINALYSIS AUTO W/SCOPE: CPT

## 2020-06-14 PROCEDURE — 85610 PROTHROMBIN TIME: CPT

## 2020-06-14 PROCEDURE — 93971 EXTREMITY STUDY: CPT

## 2020-06-14 PROCEDURE — 36415 COLL VENOUS BLD VENIPUNCTURE: CPT

## 2020-06-14 PROCEDURE — 80307 DRUG TEST PRSMV CHEM ANLYZR: CPT

## 2020-06-14 NOTE — RADIOLOGY REPORT (SQ)
EXAM DESCRIPTION: 



US EXTREMITY VEINS UNILATERAL



COMPLETED DATE/TME:  06/14/2020 19:06



CLINICAL HISTORY: 



49 years, Male, Right leg pain and swelling



COMPARISON:

None.



TECHNIQUE:

Transverse and longitudinal sonographic images of the right lower

extremity deep venous system



LIMITATIONS:

None.



FINDINGS:



No visible areas of thrombus. Normal compression and augmentation

throughout. Doppler images are unremarkable



IMPRESSION:



Negative exam

 



copyright 2011 [employer]- All Rights Reserved

## 2020-06-14 NOTE — ER DOCUMENT REPORT
ED Medical Screen (RME)





- General


Chief Complaint: Leg Swelling


Stated Complaint: ALTERED MENTAL STATUS


Time Seen by Provider: 06/14/20 19:05


Mode of Arrival: Medic


Information source: Relative


Notes: 





49-year-old male presented to ED for swelling to the right leg the last 2 days. 

Mother states he has 5 brain tumors and has been altered for about 2 to 3 weeks.

 She states that he has seen multiple doctors since this started.  He states he 

did see a urologist on June 1 for hematuria.  He states she states his walking 

is much worse than it has been.  She states he has had brain tumor since he was 

1 years old but he was pretty much independent until about 3 weeks to a month 

ago at which time she has had to do total body care.  Mother is 84 years old.








I have greeted and performed a rapid initial assessment of this patient.  A 

comprehensive ED assessment and evaluation of the patient, analysis of test 

results and completion of medical decision making process will be conducted by 

an additional ED providers.


TRAVEL OUTSIDE OF THE [place] IN LAST 30 DAYS: No





- Related Data


Allergies/Adverse Reactions: 


                                        





Sulfa (Sulfonamide Antibiotics) Allergy (Verified 06/14/20 18:58)


   


sulfamethoxazole [From Bactrim] Allergy (Verified 06/14/20 18:58)


   


trimethoprim [From Bactrim] Allergy (Verified 06/14/20 18:58)


   











Past Medical History





- Social History


Chew tobacco use (# tins/day): No


Frequency of alcohol use: None


Drug Abuse: None





- Past Medical History


Cardiac Medical History: Reports: Hx Hypertension


Renal/ Medical History: Denies: Hx Peritoneal Dialysis


Malignancy Medical History: Reports Hx Brain Cancer





- Immunizations


Hx Diphtheria, Pertussis, Tetanus Vaccination: Yes





Physical Exam





- Vital signs


Vitals: 





                                        











Temp Pulse Resp BP Pulse Ox


 


 98.4 F   90   18   121/63   95 


 


 06/14/20 18:55  06/14/20 18:55  06/14/20 18:55  06/14/20 18:55  06/14/20 18:55














Course





- Vital Signs


Vital signs: 





                                        











Temp Pulse Resp BP Pulse Ox


 


 98.4 F   90   18   121/63   95 


 


 06/14/20 18:59  06/14/20 18:55  06/14/20 18:55  06/14/20 18:55  06/14/20 18:55

## 2020-06-15 VITALS — SYSTOLIC BLOOD PRESSURE: 122 MMHG | DIASTOLIC BLOOD PRESSURE: 62 MMHG

## 2020-06-15 LAB
APPEARANCE UR: CLEAR
APTT PPP: YELLOW S
BARBITURATES UR QL SCN: NEGATIVE
BILIRUB UR QL STRIP: NEGATIVE
CK SERPL-CCNC: 316 U/L (ref 55–170)
GLUCOSE UR STRIP-MCNC: NEGATIVE MG/DL
KETONES UR STRIP-MCNC: NEGATIVE MG/DL
METHADONE UR QL SCN: NEGATIVE
PCP UR QL SCN: NEGATIVE
PH UR STRIP: 6 [PH] (ref 5–9)
PROT UR STRIP-MCNC: NEGATIVE MG/DL
SP GR UR STRIP: 1.01
URINE AMPHETAMINES SCREEN: NEGATIVE
URINE BENZODIAZEPINES SCREEN: NEGATIVE
URINE COCAINE SCREEN: NEGATIVE
URINE MARIJUANA (THC) SCREEN: NEGATIVE
UROBILINOGEN UR-MCNC: NEGATIVE MG/DL (ref ?–2)

## 2020-06-15 NOTE — ER DOCUMENT REPORT
ED General





- General


Chief Complaint: Leg Swelling


Stated Complaint: ALTERED MENTAL STATUS


Time Seen by Provider: 06/14/20 19:05


Primary Care Provider: 


DAGOBERTO PLASENCIA DO [Primary Care Provider] - Follow up as needed


Mode of Arrival: Medic


Notes: 





Patient is a 49-year-old male with a history of brain tumor diagnosed at age 1 

who presents emergency department today accompanied by his mother with a chief 

complaint of altered mental status.  Mom reports at the age of 1 it was found 

that he had a tumor.  They use some radiation therapy and he was better.  She 

states he has had no problems since then until 2014 and they found he had 

another tumor after complaining of some headaches.  He had gamma knife therapy 

to that tumor and had a recent MRI for follow-up and was found to have 5 new tu

mors.  He sees Dr. Maren Mclaughlin, neurologist at [facility].  Mom reports he was at 

baseline until about a week ago.  She states about a month ago they stopped his 

Lasix because he was urinating too frequently.  She states the doctor, urologist

placed him on Flomax and took him off of Lasix.  She states over the past 2 or 3

weeks since stopping Lasix he has had some edema in the lower extremities.  She 

states that he has not any specific complaints of pain anywhere.  She states for

the past week now he has had an alteration in mental status however.  She states

that he is not as verbal as he usually is, does not respond to typical 

conversation and seems to be staring off into space a lot.  She tried to get a 

hold of his neurologist today could not she was concerned so she brought him 

here for further evaluation and management.


TRAVEL OUTSIDE OF THE [place] IN LAST 30 DAYS: No





- Related Data


Allergies/Adverse Reactions: 


                                        





Sulfa (Sulfonamide Antibiotics) Allergy (Verified 06/14/20 18:58)


   


sulfamethoxazole [From Bactrim] Allergy (Verified 06/14/20 18:58)


   


trimethoprim [From Bactrim] Allergy (Verified 06/14/20 18:58)


   











Past Medical History





- General


Information source: Relative





- Social History


Smoking Status: Never Smoker


Chew tobacco use (# tins/day): No


Frequency of alcohol use: None


Drug Abuse: None


Family History: Reviewed & Not Pertinent


Patient has homicidal ideation: No





- Past Medical History


Cardiac Medical History: Reports: Hx Hypertension


Renal/ Medical History: Denies: Hx Peritoneal Dialysis


Malignancy Medical History: Reports Hx Brain Cancer





- Immunizations


Hx Diphtheria, Pertussis, Tetanus Vaccination: Yes





Review of Systems





- Review of Systems


-: Yes ROS unobtainable due to patient's medical condition





Physical Exam





- Vital signs


Vitals: 


                                        











Temp Pulse Resp BP Pulse Ox


 


 98.4 F   90   18   121/63   95 


 


 06/14/20 18:55  06/14/20 18:55  06/14/20 18:55  06/14/20 18:55  06/14/20 18:55














- General


General appearance: Appears well, Alert


In distress: None





- HEENT


Head: Normocephalic, Atraumatic


Eyes: Normal


Conjunctiva: Normal


Extraocular movements intact: Yes


Pupils: PERRL, Pinpoint


Mucous membranes: Normal, Moist


Neck: Supple





- Respiratory


Respiratory status: No respiratory distress


Chest status: Nontender


Breath sounds: Normal


Chest palpation: Normal





- Cardiovascular


Rhythm: Regular


Heart sounds: Normal auscultation


Murmur: No





- Abdominal


Inspection: Normal


Distension: No distension


Bowel sounds: Normal


Tenderness: Nontender


Organomegaly: No organomegaly





- Extremities


General upper extremity: Normal inspection, Nontender, Normal color, Normal ROM,

Normal temperature


General lower extremity: Normal inspection, Nontender, Normal color, Normal ROM,

Normal temperature, Normal weight bearing.  No: Marivel's sign


Notes: 





No appreciable edema to the lower extremities.  No erythema, increased warmth or

pain





- Neurological


Neuro grossly intact: Yes


Cognition: Confused, Inattentive


Isai Coma Scale Eye Opening: Spontaneous


Imperial Coma Scale Verbal: Confused


Speech: Other - Delayed


Cranial nerves: Other - Unable to assess


Sensory: Normal


Notes: 





Unable to complete a full neurological exam, given patient's current altered 

mental status





Course





- Re-evaluation


Re-evalutation: 





06/15/20 03:25


3:22 AM after reviewing the results of the patient's CT scan I called [facility] to speak with the neurosurgeon on-call for Dr. Mclaughlin, the 

patient's neurosurgeon.  I spoke with Dr. Pickens, and we discussed the CT scan 

and the changes from the patient's prior MRIs for comparison.  He recommended 

that this could go either way if the patient's mother wished to have the patient

transferred to [facility] [place] they would gladly accept and discuss next steps or since 

the patient is stable it would be okay if they went home and Dr. Pickens would 

have Dr. Mclaughlin call the patient's mother in the morning to discuss further 

steps.  The mother has elected to take the patient home rather than have him 

transferred.  We will put the images on a disc as we cannot push them to widen 

at this time because our system is down and will not be operable until next 

Tuesday to my understanding.  Patient's mom reports that member of her family is

a caregiver to patients and is good with him and can help.  She requests that 

this person be present when they go to [facility] and that they can carry the CD to

the neurosurgeons office.  Patient is stable.  They will be discharged home and 

they will follow-up with Dr. Mclaughlin tomorrow.  I advised they return here or any

ER immediately with any new, persistent or worsening symptoms.  They verbalized 

understood and agreed.





- Vital Signs


Vital signs: 


                                        











Temp Pulse Resp BP Pulse Ox


 


 97.6 F   83   16   109/68   98 


 


 06/15/20 01:22  06/15/20 01:22  06/15/20 01:22  06/15/20 01:22  06/15/20 01:22














- Laboratory


Result Diagrams: 


                                 06/14/20 19:40





                                 06/14/20 19:40


Laboratory results interpreted by me: 


                                        











  06/14/20 06/14/20 06/14/20





  19:40 19:40 19:40


 


RDW  14.2 H  


 


Sodium   136.2 L 


 


Glucose   122 H 


 


Creatine Kinase    316 H


 


Urine Blood   














  06/15/20





  01:00


 


RDW 


 


Sodium 


 


Glucose 


 


Creatine Kinase 


 


Urine Blood  SMALL H














Discharge





- Discharge


Clinical Impression: 


 History of brain tumor





Altered mental status


Qualifiers:


 Altered mental status type: unspecified Qualified Code(s): R41.82 - Altered 

mental status, unspecified





Condition: Stable


Disposition: HOME, SELF-CARE


Instructions:  Altered Mental Status ([facility])


Additional Instructions: 


You have been given a copy of the CD with the CAT scan of the head results.  

Please discuss with Dr. Mclaughlin tomorrow and preferably bring the images to his 

office tomorrow for review.  Please return here or any ER immediately with any 

new, persistent or worsening symptoms.


Referrals: 


DAGOBERTO PLASENCIA,  [Primary Care Provider] - Follow up as needed

## 2020-06-15 NOTE — RADIOLOGY REPORT (SQ)
EXAM DESCRIPTION:



[facility]: XR CHEST 1 VIEW 



CLINICAL HISTORY:

49 years  Male;  AMS, h/o tumor;



COMPARISON:

02/24/2018



FINDINGS:

Lungs: Lungs are clear, with no focal infiltrate, pneumothorax,

or pleural effusion. No pulmonary nodules are identified.

Mediastinum: Mediastinum is within normal limits for this

positioning.

Bones: Bony structures are unremarkable.



IMPRESSION:

1.  No acute pulmonary findings.

## 2020-06-15 NOTE — EKG REPORT
SEVERITY:- BORDERLINE ECG -

SINUS RHYTHM

BORDERLINE T ABNORMALITIES, ANTERIOR LEADS

:

Confirmed by: Kirill Castillo MD 15-Kevin-2020 07:18:04

## 2020-06-15 NOTE — RADIOLOGY REPORT (SQ)
EXAM DESCRIPTION: 



CT HEAD WITHOUT IV CONTRAST



COMPLETED DATE/TME:  06/14/2020 23:52



CLINICAL HISTORY: AMS, h/o tumor



COMPARISON: 5/13/2020



TECHNIQUE: Axial CT of the head obtained from the skull apex to

the skull base without contrast.



FINDINGS: 

No acute intracranial hemorrhage identified. Large left convexity

partially calcified mass measuring at least 3.8 x 2.9 x 3.5 cm is

again identified producing mass effect on the adjacent sulcal

spaces as well as producing vasogenic edema. There is again

identified midline shift now measuring 1.0 cm which is slightly

increased. Effacement of the left lateral ventricle and

persistent mild dilation of the right lateral ventricle. There

are multiple other meningiomas, one along the high left lateral

convexity measuring at least 2.4 cm in greatest dimension. There

is another right parafalcine calcified meningioma measuring at

least 1.1 cm in greatest dimension. There is coarse calcification

adjacent to the pineal region which may represent a calcified

meningioma. Scattered areas of hypodensity in the supratentorial

white matter are nonspecific and may represent sequela of chronic

small vessel ischemic change.



Paranasal sinuses are well aerated. Opacities in the left mastoid

air cells. No skull fracture identified. Lucency of the calvarium

along the site of multiple meningiomas. Visualized orbits and

globes are unremarkable. Atherosclerotic calcification of the

intracranial internal carotid arteries.





IMPRESSION:



1.  Redemonstrated multiple extra-axial masses likely

representing meningiomas. The largest is along the left lateral

convexity measuring at least 3.8 cm in greatest dimension however

this is incompletely characterized due to lack of IV contrast.

This mass produces significant local sulcal effacement and

vasogenic edema with 1.0 cm left-to-right midline shift which is

slightly increased from the comparison study. MRI with contrast

may provide additional characterization.



2.  No acute intracranial hemorrhage identified.



This exam was performed according to our departmental

dose-optimization program, which includes automated exposure

control, adjustment of the mA and/or kV according to patient size

and/or use of iterative reconstruction technique.

## 2020-10-16 ENCOUNTER — HOSPITAL ENCOUNTER (EMERGENCY)
Dept: HOSPITAL 62 - ER | Age: 50
LOS: 1 days | Discharge: HOME | End: 2020-10-17
Payer: MEDICARE

## 2020-10-16 DIAGNOSIS — Z86.711: ICD-10-CM

## 2020-10-16 DIAGNOSIS — Z79.01: ICD-10-CM

## 2020-10-16 DIAGNOSIS — Z88.2: ICD-10-CM

## 2020-10-16 DIAGNOSIS — I10: ICD-10-CM
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
## 2020-10-16 DIAGNOSIS — Z88.8: ICD-10-CM

## 2020-10-16 DIAGNOSIS — D32.0: ICD-10-CM

## 2020-10-16 DIAGNOSIS — S09.90XA: Primary | ICD-10-CM

## 2020-10-16 DIAGNOSIS — Z86.718: ICD-10-CM

## 2020-10-16 PROCEDURE — 85610 PROTHROMBIN TIME: CPT

## 2020-10-16 PROCEDURE — 36415 COLL VENOUS BLD VENIPUNCTURE: CPT

## 2020-10-16 PROCEDURE — 72125 CT NECK SPINE W/O DYE: CPT

## 2020-10-16 PROCEDURE — 70450 CT HEAD/BRAIN W/O DYE: CPT

## 2020-10-16 PROCEDURE — 99284 EMERGENCY DEPT VISIT MOD MDM: CPT

## 2020-10-16 NOTE — ER DOCUMENT REPORT
ED Fall





- General


Chief Complaint: Fall


Stated Complaint: FALL


Time Seen by Provider: 10/16/20 23:45


Primary Care Provider: 


NENO KINSEY MD [ACTIVE STAFF] - Follow up as needed


TRAVEL OUTSIDE OF THE [place] IN LAST 30 DAYS: No





- HPI


Notes: 





Patient is a 50-year-old male with a past medical history of multiple brain 

tumors who presents after a fall.  Patient is with his mother.  She states that 

he likes to be independent and tried to move from his bed to the chair but lost 

his balance and fell backwards and hit his head on the drawer.  He did not pass 

out.  He has not been vomiting.  Mother states that he is acting appropriately. 

She states it was a mechanical fall and was witnessed.  Patient is unable to 

provide a history at baseline but is alert.  Patient is on Lovenox due to a PE 

DVT.  Mother states that he finished his radiation in June.  She was concerned 

due to the blood thinner and wanted to make sure that he did not have any brain 

injury.





- Related data


Allergies/Adverse Reactions: 


                                        





Sulfa (Sulfonamide Antibiotics) Allergy (Verified 06/14/20 18:58)


   


sulfamethoxazole [From Bactrim] Allergy (Verified 06/14/20 18:58)


   


trimethoprim [From Bactrim] Allergy (Verified 06/14/20 18:58)


   











Past Medical History





- General


Information source: Parent





- Social History


Smoking Status: Never Smoker


Frequency of alcohol use: None


Drug Abuse: None


Family History: Reviewed & Not Pertinent


Patient has homicidal ideation: No





- Past Medical History


Cardiac Medical History: Reports: Hx Hypercholesterolemia, Hx Hypertension


Renal/ Medical History: Denies: Hx Peritoneal Dialysis


Malignancy Medical History: Reports Hx Brain Cancer





- Immunizations


Hx Diphtheria, Pertussis, Tetanus Vaccination: Yes





Review of Systems





- Review of Systems


Notes: 





Obtained from mother:


CONSTITUTIONAL:  No fever, fatigue or weight loss.  


SKIN:  No rash.  


HENT:  No congestion, ear pain, or sore throat.  . 


RESPIRATORY:  No cough, shortness of breath, congestion, or wheezing.  


GASTROINTESTINAL:  No abdominal pain, nausea, vomiting, bloody stools or 

diarrhea.  


MUSCULOSKELETAL:  No joint pain or swelling.  


NEUROLOGIC:  No seizures. 


HEMATOLOGIC:  No unusual bruising or bleeding.  


PSYCHIATRIC:  No depression or anxiety.





Physical Exam





- Vital signs


Vitals: 


                                        











Temp Pulse Resp BP Pulse Ox


 


 97.8 F   92   14   128/78 H  97 


 


 10/16/20 23:23  10/16/20 23:23  10/16/20 23:23  10/16/20 23:23  10/16/20 23:23














- Notes


Notes: 





VITAL SIGNS: Within normal limits.


GENERAL:  No acute distress, non-toxic appearance.  


HEAD:  Normal with no signs of head trauma.


EYES:  EOMI, conjunctiva normal, no discharge.  


EARS:  Hearing grossly intact.


NOSE: Normal.


NECK: C-collar on


CHEST:  Clear breath sounds bilaterally.  No wheezes, rales, or rhonchi.  


CARDIAC:  Regular rate and rhythm.  S1 and S2, without murmurs, gallops, or 

rubs.


ABDOMEN: Normal and soft with no tenderness, no masses or pulsatile masses.


GENITOURINARY: Normal, No tenderness


MUSCULOSKELETAL: Range of motion of left arm and left leg intact.  Chronic cyst 

to right arm and right leg.


NEUROLOGICAL:  Alert


PSYCHIATRIC:  Normal Affect, judgement and mood.


SKIN:  Normal appearance with no rashes or lesions.





Course





- Re-evaluation


Re-evalutation: 





10/17/20 01:54


Patient's head CT does not show any bleeding.  There is improvement of his 

previous meningiomas.  Patient cervical spine is also negative.  He does not 

have any lacerations or bleeding to the back of his head.  Patient is acting 

appropriately and at baseline.  Mother is very comfortable taking him home.  

Patient and mother were told to follow-up with her PCP and return for any 

concerning symptoms.





- Vital Signs


Vital signs: 


                                        











Temp Pulse Resp BP Pulse Ox


 


 97.8 F   92   14   133/79 H  95 


 


 10/16/20 23:23  10/16/20 23:23  10/17/20 01:00  10/16/20 23:33  10/17/20 01:00














Discharge





- Discharge


Clinical Impression: 


Fall


Qualifiers:


 Encounter type: initial encounter Qualified Code(s): W19.XXXA - Unspecified 

fall, initial encounter





Closed head injury


Qualifiers:


 Encounter type: initial encounter Qualified Code(s): S09.90XA - Unspecified 

injury of head, initial encounter





Condition: Stable


Disposition: HOME, SELF-CARE


Instructions:  Head Injury Precautions ([facility])


Additional Instructions: 


Please follow-up with your family doctor.  Please return to the ER for any 

headache, lightheadedness, vomiting, any other concerning symptoms.


Referrals: 


NENO KINSEY MD [ACTIVE STAFF] - Follow up in 3-5 days

## 2020-10-17 VITALS — DIASTOLIC BLOOD PRESSURE: 76 MMHG | SYSTOLIC BLOOD PRESSURE: 118 MMHG

## 2020-10-17 LAB
INR PPP: 0.96
PROTHROMBIN TIME: 13 SEC (ref 11.4–15.4)

## 2020-10-17 NOTE — RADIOLOGY REPORT (SQ)
CT cervical spine without contrast on 10/17/2020 at 12:26 AM



CLINICAL INDICATION: Trauma, pain



TECHNIQUE: Multiple axial images are obtained throughout the

cervical spine without the administration of contrast. Sagittal

and coronal reformatted images are also performed and reviewed.

This exam was performed according to our departmental

dose-optimization program, which includes automated exposure

control, adjustment of the mA and/or kV according to patient size

and/or use of iterative reconstruction technique.

Total DLP is 430.52 mGy*cm. 



COMPARISON: None 



FINDINGS: Reformatted images reveal normal alignment of the

cervical spine. Degenerative disc disease is noted especially

from C5 through C7. There is no prevertebral soft tissue

swelling. There are no acute fracture lines. No definite disc

herniation is noted.



IMPRESSION: Degenerative changes with no acute abnormality.

## 2020-10-17 NOTE — RADIOLOGY REPORT (SQ)
CT head without contrast on 10/17/2020 at 12:24 AM 



CLINICAL INDICATION: Trauma, history of brain tumors



TECHNIQUE: Multiple axial images are obtained throughout the head

without the administration of contrast. This exam was performed

according to our departmental dose-optimization program, which

includes automated exposure control, adjustment of the mA and/or

kV according to patient size and/or use of iterative

reconstruction technique.

Total DLP is 937.36 mGy*cm.



COMPARISON: MRI from 5/13/2020 and CT from 6/15/2020



FINDINGS: There are again noted multiple partially calcified

left-sided meningiomas with the largest in the left temporal

lobe.. There has been improvement in prior left-sided vasogenic

edema and improvement in left to right midline shift.

Left-to-right midline shift now measures only approximately 2 mm.

There is no hydrocephalus. The left parietal meningioma has

likely decreased in size although this is difficult to measure on

this unenhanced exam. Bilateral basal ganglia calcifications are

noted. There is no hydrocephalus. There is a small old left

caudate lacunar infarct again noted. There is low-density in the

periventricular white matter consistent with chronic small vessel

ischemic changes. There is no hemorrhage. There are no abnormal

extra-axial fluid collections. No new or worsening mass is noted.

No bony abnormality is noted.



IMPRESSION: Stable or improved appearance of multiple left-sided

meningiomas with improvement in left-sided vasogenic edema and

improved left to right midline shift.

## 2020-10-22 ENCOUNTER — HOSPITAL ENCOUNTER (EMERGENCY)
Dept: HOSPITAL 62 - ER | Age: 50
Discharge: ANOTHER HOSPITAL | End: 2020-10-22
Payer: MEDICARE

## 2020-10-22 VITALS — DIASTOLIC BLOOD PRESSURE: 66 MMHG | SYSTOLIC BLOOD PRESSURE: 131 MMHG

## 2020-10-22 DIAGNOSIS — Z88.3: ICD-10-CM
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
## 2020-10-22 DIAGNOSIS — E78.00: ICD-10-CM

## 2020-10-22 DIAGNOSIS — Z88.2: ICD-10-CM

## 2020-10-22 DIAGNOSIS — I10: ICD-10-CM

## 2020-10-22 DIAGNOSIS — Z86.718: ICD-10-CM

## 2020-10-22 DIAGNOSIS — Z86.711: ICD-10-CM

## 2020-10-22 DIAGNOSIS — Z79.01: ICD-10-CM

## 2020-10-22 DIAGNOSIS — S06.350A: Primary | ICD-10-CM

## 2020-10-22 DIAGNOSIS — D32.9: ICD-10-CM

## 2020-10-22 DIAGNOSIS — R22.0: ICD-10-CM

## 2020-10-22 LAB
ABSOLUTE LYMPHOCYTES# (MANUAL): 4.6 10^3/UL (ref 0.5–4.7)
ABSOLUTE MONOCYTES # (MANUAL): 1.4 10^3/UL (ref 0.1–1.4)
ADD MANUAL DIFF: YES
ALBUMIN SERPL-MCNC: 3.6 G/DL (ref 3.5–5)
ALP SERPL-CCNC: 134 U/L (ref 38–126)
ANION GAP SERPL CALC-SCNC: 8 MMOL/L (ref 5–19)
ANISOCYTOSIS BLD QL SMEAR: (no result)
APTT BLD: 31.9 SEC (ref 23.5–35.8)
AST SERPL-CCNC: 155 U/L (ref 17–59)
BASOPHILS NFR BLD MANUAL: 0 % (ref 0–2)
BILIRUB DIRECT SERPL-MCNC: 0.3 MG/DL (ref 0–0.4)
BILIRUB SERPL-MCNC: 0.9 MG/DL (ref 0.2–1.3)
BUN SERPL-MCNC: 33 MG/DL (ref 7–20)
CALCIUM: 9 MG/DL (ref 8.4–10.2)
CHLORIDE SERPL-SCNC: 70 MMOL/L (ref 98–107)
CO2 SERPL-SCNC: 46 MMOL/L (ref 22–30)
EOSINOPHIL NFR BLD MANUAL: 0 % (ref 0–6)
ERYTHROCYTE [DISTWIDTH] IN BLOOD BY AUTOMATED COUNT: 17.1 % (ref 11.5–14)
GLUCOSE SERPL-MCNC: 104 MG/DL (ref 75–110)
HCT VFR BLD CALC: 33.4 % (ref 37.9–51)
HGB BLD-MCNC: 11.6 G/DL (ref 13.5–17)
INR PPP: 0.96
MCH RBC QN AUTO: 30.6 PG (ref 27–33.4)
MCHC RBC AUTO-ENTMCNC: 34.7 G/DL (ref 32–36)
MCV RBC AUTO: 88 FL (ref 80–97)
MONOCYTES % (MANUAL): 6 % (ref 3–13)
PLATELET # BLD: 254 10^3/UL (ref 150–450)
PLATELET COMMENT: ADEQUATE
POTASSIUM SERPL-SCNC: 2.6 MMOL/L (ref 3.6–5)
PROT SERPL-MCNC: 6 G/DL (ref 6.3–8.2)
PROTHROMBIN TIME: 13 SEC (ref 11.4–15.4)
RBC # BLD AUTO: 3.79 10^6/UL (ref 4.35–5.55)
SEGMENTED NEUTROPHILS % (MAN): 74 % (ref 42–78)
TOTAL CELLS COUNTED BLD: 100
TOXIC GRANULES BLD QL SMEAR: SLIGHT
VARIANT LYMPHS NFR BLD MANUAL: 20 % (ref 13–45)
WBC # BLD AUTO: 23.1 10^3/UL (ref 4–10.5)
WBC TOXIC VACUOLES BLD QL SMEAR: PRESENT

## 2020-10-22 PROCEDURE — 36415 COLL VENOUS BLD VENIPUNCTURE: CPT

## 2020-10-22 PROCEDURE — 70450 CT HEAD/BRAIN W/O DYE: CPT

## 2020-10-22 PROCEDURE — 80053 COMPREHEN METABOLIC PANEL: CPT

## 2020-10-22 PROCEDURE — 85730 THROMBOPLASTIN TIME PARTIAL: CPT

## 2020-10-22 PROCEDURE — 99285 EMERGENCY DEPT VISIT HI MDM: CPT

## 2020-10-22 PROCEDURE — 85025 COMPLETE CBC W/AUTO DIFF WBC: CPT

## 2020-10-22 PROCEDURE — 85610 PROTHROMBIN TIME: CPT

## 2020-10-22 NOTE — RADIOLOGY REPORT (SQ)
EXAM DESCRIPTION:  CT HEAD WITHOUT



IMAGES COMPLETED DATE/TIME:  10/22/2020 5:51 pm



REASON FOR STUDY:  fall, blood thinner



COMPARISON:  10/17/2020, 10/15/2020



TECHNIQUE:  Axial images acquired through the brain without intravenous contrast.  Images reviewed wi
th bone, brain and subdural windows.  Additional sagittal and coronal reconstructions were generated.
 Images stored on PACS.

All CT scanners at this facility use dose modulation, iterative reconstruction, and/or weight based d
osing when appropriate to reduce radiation dose to as low as reasonably achievable (ALARA).

[facility]: Dose Right  [facility]: CareDose    [facility]: Dose Right    [facility]: Teradose 4D    [facility]: Smart Endymed



RADIATION DOSE:  CT Rad equipment meets quality standard of care and radiation dose reduction techniq
ues were employed. CTDIvol: 55.2 mGy. DLP: 1056 mGy-cm. mGy.



LIMITATIONS:  None.



FINDINGS:  VENTRICLES: Normal size and contour.

CEREBRUM: Chronic meningiomas by history in the left hemisphere.  Largest is left parietal and this a
ppears to be stable since June 2020

Additional apparent meningioma difficult to U outlined because of lack of contrast is in the left par
ietooccipital lobe.  There is has been an interval gamma knife surgery since June with reduction in t
he vasogenic edema on the previous study of 10/17/2020.

Since the study of 10/17/2020, there has been interval hemorrhage in the meningioma causing mild incr
ease in the vasogenic edema as well as compression of the occipital horn of the left lateral ventricl
e.  There is very minimal midline shift.

Findings in the right hemisphere intact.  Left midbrain calcification intact. Normal gray/white matte
r differentiation. No areas of low density in the white matter.

CEREBELLUM: No masses.  No hemorrhage.  No alteration of density.  No evidence for acute infarction.

EXTRAAXIAL SPACES: No fluid collections.  No masses.

ORBITS AND GLOBE: No intra- or extraconal masses.  Normal contour of globe without masses.

CALVARIUM: No fracture.

PARANASAL SINUSES: No fluid or mucosal thickening.

SOFT TISSUES: No mass or hematoma.

OTHER: No other significant finding.



IMPRESSION:  Interval hemorrhage in or adjacent to the left parietooccipital meningioma causing compr
ession of the ipsilateral occipital horn of the left lateral ventricle and very minimal midline shift
.  This has occurred since the study of 10/17/2020.

The other meningioma in the left temporal lobe appears to be stable.  Other findings are stable.

EVIDENCE OF ACUTE STROKE: NO.



COMMENT:   Pertinent findings on the imaging study reported as a CRITICAL RESULT to LINUS HOLMAN MD at18:15 on 10/22/2020.

Category of Critical Result: Intracranial hemorrhage

Quality ID # [id number]: Final reports with documentation of one or more dose reduction techniques (e.g., Au
tomated exposure control, adjustment of the mA and/or kV according to patient size, use of iterative 
reconstruction technique)



TECHNICAL DOCUMENTATION:  JOB ID:  [id number]

 2011 [employer]- All Rights Reserved



Reading location - IP/workstation name: [email or web address]

## 2020-10-22 NOTE — ER DOCUMENT REPORT
ED Fall





- General


Chief Complaint: Fall Injury


Stated Complaint: FALL SWOLLEN FACE


Time Seen by Provider: 10/22/20 16:50


Primary Care Provider: 


DAGOBERTO PLASENCIA DO [Primary Care Provider] - Follow up as needed


Notes: 


This 50-year-old man presents to the emergency department with a history of 

meningioma which are being managed at [facility].


He presents to the ER with history of a fall at home.  According to his mother, 

he did not hit his head, however, he is on blood thinners for DVT and pulmonary 

embolus, he is brought to the ER for evaluation.  He was seen in the emergency 

department on 10/16/2020 with a fall at that time no intracerebral hemorrhage 

was noted.  Apparently, he has been having speech difficulty that started after 

the fall on 10/16/20.  Today he was on the way to [facility] for outpatient scans to

evaluate the change in speech.  He was scheduled for outpatient MRI. 


After the fall, patient's mother contacted his radiation oncologist, Dr. Galindo.  They were directed to come to the emergency department for further 

evaluation, EMS was called and the patient was transported.  He also patient is 

seen by [facility] Neurologist, Dr. Mclaughlin. Patient is alert, but only responds to 

yes/no questions.  He is on lovenox, due to a history DVT and pulmonary embolus.




TRAVEL OUTSIDE OF THE [place] IN LAST 30 DAYS: No





- Related data


Allergies/Adverse Reactions: 


                                        





Sulfa (Sulfonamide Antibiotics) Allergy (Verified 06/14/20 18:58)


   


sulfamethoxazole [From Bactrim] Allergy (Verified 06/14/20 18:58)


   


trimethoprim [From Bactrim] Allergy (Verified 06/14/20 18:58)


   











Past Medical History





- Social History


Smoking Status: Never Smoker


Frequency of alcohol use: None


Drug Abuse: None


Family History: Reviewed & Not Pertinent





- Past Medical History


Cardiac Medical History: Reports: Hx Hypercholesterolemia, Hx Hypertension


Renal/ Medical History: Denies: Hx Peritoneal Dialysis


Malignancy Medical History: Reports Hx Brain Cancer





- Immunizations


Hx Diphtheria, Pertussis, Tetanus Vaccination: Yes





Review of Systems





- Review of Systems


Notes: 





Constitutional: Negative for fever.


HENT: Negative for sore throat.


Eyes: Negative for visual changes.


Cardiovascular: Negative for chest pain.


Respiratory: Negative for shortness of breath.


Gastrointestinal: Negative for abdominal pain, vomiting or diarrhea.


Genitourinary: Negative for dysuria.


Musculoskeletal: + Ecchymosis


Skin: Negative for rash.


Neurological:+Deterioration of speech .





10 point ROS negative except as marked above and in HPI.





Physical Exam





- Vital signs


Vitals: 


                                        











Temp Resp BP Pulse Ox


 


 98.0 F   24 H  128/75 H  98 


 


 10/22/20 16:05  10/22/20 16:05  10/22/20 16:05  10/22/20 16:05














Course





- Re-evaluation


Re-evalutation: 





10/22/20 19:20


Interval CT scan reveals hemorrhage in or adjacent to the left parieto-occipital

meningioma causing compression of the ipsilateral occipital horn of the left 

lateral ventricle and a very minimal midline shift.  I have contacted the 

radiation oncologist Dr. Araiza, he has suggested the patient be to transfer 

to floor for closer monitoring and treatment.  I have contacted trauma services 

at [facility], Dr. Phillips, has agreed to accept the patient as a trauma green

patient to get transport to [place] and will consult with his ongoing care 

team regarding treatment.





Discussed this plan with mother and explained that given his hemorrhage and 

Lovenox treatment we would like to expedite his transfer she also has agreed to 

the air transport to [place].


10/22/20 19:59





Transfer Evaluation Prior to Transport.


Patient is being transferred to [facility] , evaluation at the time 

of transfer, patient is hemodynamically stable. Transport team successfully 

moved the patient onto the stretcher without incident.  Patient stable for 

transport.





- Vital Signs


Vital signs: 


                                        











Temp Pulse Resp BP Pulse Ox


 


 98.0 F      16   131/66 H  96 


 


 10/22/20 16:05     10/22/20 18:01  10/22/20 18:01  10/22/20 18:01














- Laboratory


Result Diagrams: 


                                 10/22/20 19:04





                                 10/22/20 19:04


Laboratory results interpreted by me: 


                                        











  10/22/20 10/22/20





  19:04 19:04


 


WBC  23.1 H 


 


RBC  3.79 L 


 


Hgb  11.6 L 


 


Hct  33.4 L 


 


RDW  17.1 H 


 


Sodium   123.7 L


 


Potassium   2.6 L*


 


Chloride   70 L


 


Carbon Dioxide   46 H*


 


BUN   33 H


 


AST   155 H


 


ALT   460 H


 


Alkaline Phosphatase   134 H


 


Total Protein   6.0 L














- Diagnostic Test


Radiology reviewed: Image reviewed, Reports reviewed


Radiology results interpreted by me: 





10/22/20 19:22





                                        





Head CT  10/22/20 17:24


IMPRESSION:  Interval hemorrhage in or adjacent to the left parietooccipital 

meningioma causing compression of the ipsilateral occipital horn of the left 

lateral ventricle and very minimal midline shift.  This has occurred since the s

tudy of 10/17/2020.


The other meningioma in the left temporal lobe appears to be stable.  Other find

ings are stable.


EVIDENCE OF ACUTE STROKE: NO.


 














Critical Care Note





- Critical Care Note


Total time excluding time spent on procedures (mins): 45 - Critical care time 

spent obtaining history from patient or surrogate, discussions with consultants,

development of treatment plan with patient or surrogate, evaluation of patient's

response to treatment, examination of patient, ordering and performing 

treatments and interventions, ordering and review of laboratory studies, re-

evaluation of patient's condition, ordering and review of radiographic studies 

and review of old charts





Discharge





- Discharge


Clinical Impression: 


Intracerebral hemorrhage


Qualifiers:


 Intracerebral hemorrhage etiology: traumatic Encounter type: initial encounter 

Laterality: left Loss of consciousness presence/duration: without LOC Qualified 

Code(s): S06.350A - Traumatic hemorrhage of left cerebrum without loss of 

consciousness, initial encounter





Fall


Qualifiers:


 Encounter type: initial encounter Qualified Code(s): W19.XXXA - Unspecified 

fall, initial encounter





Disposition: Critical access hospital


Referrals: 


DAGOBERTO PLASENCIA DO [Primary Care Provider] - Follow up as needed